# Patient Record
Sex: MALE | URBAN - METROPOLITAN AREA
[De-identification: names, ages, dates, MRNs, and addresses within clinical notes are randomized per-mention and may not be internally consistent; named-entity substitution may affect disease eponyms.]

---

## 2022-04-12 ENCOUNTER — TELEPHONE (OUTPATIENT)
Dept: BEHAVIORAL HEALTH | Facility: CLINIC | Age: 57
End: 2022-04-12

## 2022-04-13 NOTE — TELEPHONE ENCOUNTER
Inpatient Bed Call Log 4/13/2022 done at 8am    Adults:    [facility] is posting 0 beds.     [facility] is posting 0 beds.    [facility] is posting 0 beds.    [facility] is posting 0 beds.    [facility] is posting 0 beds.    [facility] is posting 0 beds.    [facility] is posting 0 beds.     is posting 0 beds.    [facility] is posting 0 beds. Mixed unit 12+. Low acuity only.     [facility] is positing 0 beds. No aggression.     [facility] is posting 0 beds.     [facility] is posting 0 beds. Low acuity only.    [facility] is posting 0 beds.    [facility] is posting 3 beds. Low acuity.     [facility] is posting 2 beds. 72 hr hold required.     [facility] is posting 0 beds.     [facility] is posting 1 beds. Vol only, No Hx of aggression, violence or assault. No sexual offenders. No 72 hr holds.    [facility] is posting 5 beds. (Must have the cognitive ability to do programming. No aggressive or violent behavior or recent HX in the last 2 yrs. MH must be primary.)    [facility] is posting 0 beds. Low acuity only. Violence and aggression capped.     [facility] is posting 1 beds. Low acuity, Neg Covid.     [facility] is posting 0 beds. Covid neg. Vol only. Combined adolescent and adult unit. No aggressive or violent behavior. No registered sex offenders.     [facility] is posting 6 beds. Call for details.     [facility] is posting 5 beds.      9:20am Intake was told that the pt discharged home. Pt was removed from the work list.

## 2022-04-13 NOTE — TELEPHONE ENCOUNTER
Patient cleared and ready for behavioral bed placement: Yes     S:  10:23 PM Hallie CHATMAN presented 56/M at [facility] for Anxiety and overwhelming stress    B:  Pt came to ED with wife.  Pt reports increasing depression and anxiety.  Pt reports getting panic attacks.  Pt reports feeling shortness of breath, tearfulness; overwhelmed; stressed out and unable to cope.  Pt reports it has been building for last months.  Pt reports getting little sleep, not eating, and forgetting to take medications.  Pt reports family stress.  Pt has a Hx of SI in past but not today.    No SIB, no HI, aggression, or psychosis.  Pt reports using his CPap machine and needs it on unit.    Pt uses chewing tobacco with occasionally ETOH. And Denied any other substances.  Medically cleared    A:  Voluntary    R:  Added to the worklist    [facility] d[facility] will fax over medical records.